# Patient Record
Sex: FEMALE | URBAN - METROPOLITAN AREA
[De-identification: names, ages, dates, MRNs, and addresses within clinical notes are randomized per-mention and may not be internally consistent; named-entity substitution may affect disease eponyms.]

---

## 2022-02-08 ENCOUNTER — OFFICE VISIT (OUTPATIENT)
Dept: URBAN - METROPOLITAN AREA CLINIC 82 | Facility: CLINIC | Age: 22
End: 2022-02-08
Payer: COMMERCIAL

## 2022-02-08 DIAGNOSIS — H52.13 MYOPIA, BILATERAL: Primary | ICD-10-CM

## 2022-02-08 PROCEDURE — 92004 COMPRE OPH EXAM NEW PT 1/>: CPT | Performed by: OPTOMETRIST

## 2022-02-08 ASSESSMENT — KERATOMETRY
OS: 44.38
OD: 44.38

## 2022-02-08 ASSESSMENT — INTRAOCULAR PRESSURE
OS: 20
OD: 21

## 2022-02-08 ASSESSMENT — VISUAL ACUITY
OD: 20/20
OS: 20/20

## 2022-02-08 NOTE — IMPRESSION/PLAN
Impression: Myopia, bilateral: H52.13. Plan: Educated pt on exam findings. Updated and finalized SRx. Educated pt on 1-2 week adaptation period with updated SRx. Pt expressed understanding. RTC 1 year for CE, or PRN. Pt is pregnant at this time, encouraged pt RTC after delivering/finished nursing for [facility]. Educated pt on importance of RTC sooner if changes in vision noted. Discussed possible changes in prescription with pregnancy.